# Patient Record
(demographics unavailable — no encounter records)

---

## 2024-11-11 NOTE — ASSESSMENT
[FreeTextEntry1] : Cough - ? allergies Medrol dose pack Mucinex prn increase po fluids  Dermatitis referred to dermatology  Hx of thyroid cancer, s/p thyroidectomy, Hypothyroidism on Synthroid 100 mcg 6 x weekly on Cytomel 5 mcg 6 x weekly Follows with endocrine (Dr. Cancino) every 3 months  HLD Reinforced the importance of following a low fat/low cholesterol diet we'll check lipids today  GERD cont Famotidine 20mg daily prn  follows with anti-aging doctor - is on estrogen and testosterone

## 2024-11-11 NOTE — HISTORY OF PRESENT ILLNESS
[de-identified] : Ms. ENA RATLIFF is a 57 year old female with Hx of thyroid ca, s/p thyroidectomy on Synthroid, HLD, anemia, anxiety, hearing impaired, GERD, who presents for an acute visit.  Pt c/o sore throat that started 10 days ago and resolved. Pt states after that, she started developing a cough. Takes Tylenol prn. Denies fever, chills C/o itchy rash all over her body. She has been trying out different soaps. She has been using topical steroids on regions of outbreak with no relief. Denies any SOB, CP, abdominal pain, N/V/D, headache, dizziness, or leg swelling.

## 2024-11-11 NOTE — REVIEW OF SYSTEMS
[Cough] : cough [Itching] : Itching [Skin Rash] : skin rash [Negative] : Heme/Lymph [Sore Throat] : no sore throat [FreeTextEntry6] : see hpi

## 2024-11-11 NOTE — ADDENDUM
[FreeTextEntry1] : Documented by Ana Mchugh acting as a scribe for Dr. Kelli Hernandez. 11/05/2024   All medical record entries made by the scribe were at my, Dr. Kelli Hernandez, direction and personally dictated by me on 11/05/2024. I have reviewed the chart and agree that the record accurately reflects my personal performance of the history, physical exam, assessment and plan. I have also personally directed, reviewed, and agreed with the chart.

## 2024-11-11 NOTE — PHYSICAL EXAM
[No Acute Distress] : no acute distress [Well Nourished] : well nourished [Well Developed] : well developed [Well-Appearing] : well-appearing [Normal Sclera/Conjunctiva] : normal sclera/conjunctiva [Normal Outer Ear/Nose] : the outer ears and nose were normal in appearance [Normal TMs] : both tympanic membranes were normal [No JVD] : no jugular venous distention [No Lymphadenopathy] : no lymphadenopathy [Supple] : supple [No Respiratory Distress] : no respiratory distress  [No Accessory Muscle Use] : no accessory muscle use [Normal Rate] : normal rate  [Regular Rhythm] : with a regular rhythm [Normal S1, S2] : normal S1 and S2 [No Murmur] : no murmur heard [Pedal Pulses Present] : the pedal pulses are present [No Edema] : there was no peripheral edema [No Extremity Clubbing/Cyanosis] : no extremity clubbing/cyanosis [Soft] : abdomen soft [Non Tender] : non-tender [Non-distended] : non-distended [Normal Posterior Cervical Nodes] : no posterior cervical lymphadenopathy [Normal Anterior Cervical Nodes] : no anterior cervical lymphadenopathy [No CVA Tenderness] : no CVA  tenderness [No Spinal Tenderness] : no spinal tenderness [No Joint Swelling] : no joint swelling [Grossly Normal Strength/Tone] : grossly normal strength/tone [Coordination Grossly Intact] : coordination grossly intact [No Focal Deficits] : no focal deficits [Normal Gait] : normal gait [Normal Affect] : the affect was normal [Normal Insight/Judgement] : insight and judgment were intact [de-identified] : + b/l hearing aids [de-identified] : mild eryhtematous papular radh in neck, arms, legs

## 2024-11-11 NOTE — PHYSICAL EXAM
[No Acute Distress] : no acute distress [Well Nourished] : well nourished [Well Developed] : well developed [Well-Appearing] : well-appearing [Normal Sclera/Conjunctiva] : normal sclera/conjunctiva [Normal Outer Ear/Nose] : the outer ears and nose were normal in appearance [Normal TMs] : both tympanic membranes were normal [No JVD] : no jugular venous distention [No Lymphadenopathy] : no lymphadenopathy [Supple] : supple [No Respiratory Distress] : no respiratory distress  [No Accessory Muscle Use] : no accessory muscle use [Normal Rate] : normal rate  [Regular Rhythm] : with a regular rhythm [Normal S1, S2] : normal S1 and S2 [No Murmur] : no murmur heard [Pedal Pulses Present] : the pedal pulses are present [No Edema] : there was no peripheral edema [No Extremity Clubbing/Cyanosis] : no extremity clubbing/cyanosis [Soft] : abdomen soft [Non Tender] : non-tender [Non-distended] : non-distended [Normal Posterior Cervical Nodes] : no posterior cervical lymphadenopathy [Normal Anterior Cervical Nodes] : no anterior cervical lymphadenopathy [No CVA Tenderness] : no CVA  tenderness [No Spinal Tenderness] : no spinal tenderness [No Joint Swelling] : no joint swelling [Grossly Normal Strength/Tone] : grossly normal strength/tone [Coordination Grossly Intact] : coordination grossly intact [No Focal Deficits] : no focal deficits [Normal Gait] : normal gait [Normal Affect] : the affect was normal [Normal Insight/Judgement] : insight and judgment were intact [de-identified] : + b/l hearing aids [de-identified] : mild eryhtematous papular radh in neck, arms, legs

## 2024-11-11 NOTE — HISTORY OF PRESENT ILLNESS
[de-identified] : Ms. ENA RATLIFF is a 57 year old female with Hx of thyroid ca, s/p thyroidectomy on Synthroid, HLD, anemia, anxiety, hearing impaired, GERD, who presents for an acute visit.  Pt c/o sore throat that started 10 days ago and resolved. Pt states after that, she started developing a cough. Takes Tylenol prn. Denies fever, chills C/o itchy rash all over her body. She has been trying out different soaps. She has been using topical steroids on regions of outbreak with no relief. Denies any SOB, CP, abdominal pain, N/V/D, headache, dizziness, or leg swelling.

## 2024-11-12 NOTE — HISTORY OF PRESENT ILLNESS
[de-identified] : Ms. ENA RATLIFF is a 57 year old female with Hx of thyroid ca, s/p thyroidectomy on Synthroid, HLD, anemia, anxiety, hearing impaired, GERD, who presents for a follow up visit.   She continues to experience cough. Denies PND, fever, chills, sore throat, SOB, chest pain.  Pt states she developed a rash around the end of October. Rash is around her hairline and behind her ears , She has just completed the Medrol dose pack. Says rash went away when she was taking the steroids but now it's back. Denies any SOB, CP, abdominal pain, N/V/D, headache, dizziness, or leg swelling.

## 2024-11-12 NOTE — ASSESSMENT
[FreeTextEntry1] : Follow up  Cough start Z-pack 250mg start Prednisone 10mg Ventolin inhaler increase po fluids referred for CXR referred to pulmonary   Contact dermatitis- ? due to pt using hair turban for hair when at home. Instructed to stop using it. pt has Clobetasol cream at home - instructed to use BID prn referred to dermatology  Hx of left knee pain referred to ortho  Hx of thyroid cancer, s/p thyroidectomy, Hypothyroidism on Synthroid 100 mcg 6 x weekly on Cytomel 5 mcg 6 x weekly Follows with endocrine (Dr. Cancino) every 3 months  HLD Reinforced the importance of following a low fat/low cholesterol diet  GERD cont Famotidine 20mg daily prn  follows with anti-aging doctor - is on estrogen and testosterone.

## 2024-11-12 NOTE — PHYSICAL EXAM
[No Acute Distress] : no acute distress [Well Nourished] : well nourished [Well Developed] : well developed [Well-Appearing] : well-appearing [Normal Sclera/Conjunctiva] : normal sclera/conjunctiva [Normal Outer Ear/Nose] : the outer ears and nose were normal in appearance [No JVD] : no jugular venous distention [No Lymphadenopathy] : no lymphadenopathy [Supple] : supple [Thyroid Normal, No Nodules] : the thyroid was normal and there were no nodules present [No Respiratory Distress] : no respiratory distress  [No Accessory Muscle Use] : no accessory muscle use [Normal Rate] : normal rate  [Regular Rhythm] : with a regular rhythm [Normal S1, S2] : normal S1 and S2 [No Murmur] : no murmur heard [Pedal Pulses Present] : the pedal pulses are present [No Edema] : there was no peripheral edema [No Extremity Clubbing/Cyanosis] : no extremity clubbing/cyanosis [Soft] : abdomen soft [Non Tender] : non-tender [Non-distended] : non-distended [Normal Posterior Cervical Nodes] : no posterior cervical lymphadenopathy [Normal Anterior Cervical Nodes] : no anterior cervical lymphadenopathy [No CVA Tenderness] : no CVA  tenderness [No Spinal Tenderness] : no spinal tenderness [No Joint Swelling] : no joint swelling [Grossly Normal Strength/Tone] : grossly normal strength/tone [Coordination Grossly Intact] : coordination grossly intact [No Focal Deficits] : no focal deficits [Normal Gait] : normal gait [Normal Affect] : the affect was normal [Normal Insight/Judgement] : insight and judgment were intact [Clear to Auscultation] : lungs were clear to auscultation bilaterally [de-identified] : + b/l hearing aids [de-identified] : + rash around hair line and behind ears

## 2024-11-12 NOTE — ADDENDUM
[FreeTextEntry1] : Documented by Ana Mhcugh acting as a scribe for Dr. Kelli Hernandez. 11/12/2024   All medical record entries made by the scribe were at my, Dr. Kelli Hernandez, direction and personally dictated by me on 11/12/2024. I have reviewed the chart and agree that the record accurately reflects my personal performance of the history, physical exam, assessment and plan. I have also personally directed, reviewed, and agreed with the chart.

## 2024-11-12 NOTE — REVIEW OF SYSTEMS
[Cough] : cough [Skin Rash] : skin rash [Negative] : Heme/Lymph [Fever] : no fever [Chills] : no chills [Postnasal Drip] : no postnasal drip

## 2025-04-16 NOTE — PHYSICAL EXAM
[MA] : MA [FreeTextEntry2] : Pascale Elliott [Appropriately responsive] : appropriately responsive [Alert] : alert [No Acute Distress] : no acute distress [No Lymphadenopathy] : no lymphadenopathy [Soft] : soft [Non-tender] : non-tender [Non-distended] : non-distended [No HSM] : No HSM [No Lesions] : no lesions [No Mass] : no mass [Oriented x3] : oriented x3 [Examination Of The Breasts] : a normal appearance [No Masses] : no breast masses were palpable [Labia Majora] : normal [Labia Minora] : normal [Normal] : normal [Uterine Adnexae] : normal

## 2025-04-16 NOTE — HISTORY OF PRESENT ILLNESS
[FreeTextEntry1] : WWV Plan seeing MEGAN breast MD as plastic MD at [facility] does not take her insurance Having a mastectomy [Mammogramdate] : breast ca dx--finding another breast MD [PapSmeardate] : emr [ColonoscopyDate] : emr

## 2025-04-16 NOTE — HISTORY OF PRESENT ILLNESS
[FreeTextEntry1] : WWV Plan seeing MEGAN breast MD as plastic MD at [facility] does not take her insurance Having a mastectomy [Mammogramdate] : breast ca dx--finding another breast MD [PapSmeardate] : emr [ColonoscopyDate] : emr
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
## 2025-07-25 NOTE — ADDENDUM
[FreeTextEntry1] :  I, Kiran Diaz, acted solely as a scribe for Dr. Arita on this date on 07/25/2025.

## 2025-07-25 NOTE — PHYSICAL EXAM
[de-identified] : - Constitutional: This is a female in no obvious distress. She is accompanied by her son.  - Psych: Patient is alert and oriented to person, place and time.  Patient has a normal mood and affect.  Examination of her right hand and wrist demonstrates no obvious acute swelling.  She has tenderness along the CMC joint of the thumb.  There is no crepitus.  There is no tenderness along the distal radius or carpal bones.  She has appropriate flexion and extension of the digits.  Provocative signs for carpal tunnel syndrome are negative.  She has intact sensation to light touch distally along the radial, ulnar and median nerve distributions. [de-identified] : I reviewed MRI of the right wrist dated 1/19/202 which demonstrate  Impression:  -No evidence of an acute fracture -Degenerative changes-worse at the basilar joint.  -Scarring of the scapholunate ligament.
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
## 2025-07-25 NOTE — REASON FOR VISIT
[Initial Visit] : an initial visit for [FreeTextEntry2] : right wrist Psychiatric Psychiatric Psychiatric Psychiatric Psychiatric

## 2025-07-25 NOTE — END OF VISIT
[FreeTextEntry3] : This note was written by Kiran Diaz on 07/25/2025 acting solely as a scribe for Dr. Evelio Arita.   All medical record entries made by the Scribe were at my, Dr. Evelio Arita, direction and personally dictated by me on 07/25/2025. I have personally reviewed the chart and agree that the record accurately reflects my personal performance of the history, physical exam, assessment and plan.